# Patient Record
Sex: MALE | URBAN - METROPOLITAN AREA
[De-identification: names, ages, dates, MRNs, and addresses within clinical notes are randomized per-mention and may not be internally consistent; named-entity substitution may affect disease eponyms.]

---

## 2024-03-02 ENCOUNTER — APPOINTMENT (RX ONLY)
Dept: URBAN - METROPOLITAN AREA CLINIC 335 | Facility: CLINIC | Age: 59
Setting detail: DERMATOLOGY
End: 2024-03-02

## 2024-03-02 DIAGNOSIS — L65.9 NONSCARRING HAIR LOSS, UNSPECIFIED: ICD-10-CM | Status: INADEQUATELY CONTROLLED

## 2024-03-02 DIAGNOSIS — L72.0 EPIDERMAL CYST: ICD-10-CM | Status: INADEQUATELY CONTROLLED

## 2024-03-02 PROCEDURE — ? ACNE SURGERY

## 2024-03-02 PROCEDURE — 10040 EXTRACTION: CPT

## 2024-03-02 PROCEDURE — ? IN-HOUSE DISPENSING PHARMACY

## 2024-03-02 PROCEDURE — ? ORDER TESTS

## 2024-03-02 PROCEDURE — ? ADDITIONAL NOTES

## 2024-03-02 PROCEDURE — ? COUNSELING

## 2024-03-02 PROCEDURE — 99204 OFFICE O/P NEW MOD 45 MIN: CPT | Mod: 25

## 2024-03-02 ASSESSMENT — LOCATION DETAILED DESCRIPTION DERM
LOCATION DETAILED: MEDIAL FRONTAL SCALP
LOCATION DETAILED: RIGHT MEDIAL MALAR CHEEK

## 2024-03-02 ASSESSMENT — LOCATION ZONE DERM
LOCATION ZONE: FACE
LOCATION ZONE: SCALP

## 2024-03-02 ASSESSMENT — LOCATION SIMPLE DESCRIPTION DERM
LOCATION SIMPLE: RIGHT CHEEK
LOCATION SIMPLE: FRONTAL SCALP

## 2024-03-02 NOTE — PROCEDURE: ACNE SURGERY
Extraction Method: lancet
Post-Care Instructions: I reviewed with the patient in detail post-care instructions. Patient is to keep the treatment areas dry overnight, and then apply bacitracin twice daily until healed. Patient may apply hydrogen peroxide soaks to remove any crusting.
Acne Type: Comedonal Lesions
Render Number Of Lesions Treated: no
Consent was obtained and risks were reviewed including but not limited to scarring, infection, bleeding, scabbing, incomplete removal, and allergy to anesthesia.
Prep Text (Optional): Prior to removal the treatment areas were prepped in the usual fashion.
Detail Level: Detailed

## 2024-03-02 NOTE — PROCEDURE: ORDER TESTS
Performing Laboratory: [id number]
Expected Date Of Service: 03/02/2024
Billing Type: Third-Party Bill
Bill For Surgical Tray: no

## 2024-03-02 NOTE — PROCEDURE: IN-HOUSE DISPENSING PHARMACY
Product 17 Unit Type: mg
Product 68 Refills: 0
Name Of Product 6: Alopecia Topical solution
Product 6 Amount/Unit (Numbers Only): 60
Send Charges To Patient Encounter: Yes
Product 6 Price/Unit (In Dollars): 55
Product 6 Refills: 3
Product 6 Application Directions: Apply a small amount onto scalp three times a week
Product 6 Unit Type: ml
Detail Level: Zone